# Patient Record
Sex: MALE | Race: WHITE | NOT HISPANIC OR LATINO | Employment: FULL TIME | URBAN - METROPOLITAN AREA
[De-identification: names, ages, dates, MRNs, and addresses within clinical notes are randomized per-mention and may not be internally consistent; named-entity substitution may affect disease eponyms.]

---

## 2018-07-15 ENCOUNTER — HOSPITAL ENCOUNTER (EMERGENCY)
Facility: HOSPITAL | Age: 53
Discharge: HOME | End: 2018-07-15
Attending: EMERGENCY MEDICINE
Payer: COMMERCIAL

## 2018-07-15 VITALS
OXYGEN SATURATION: 98 % | WEIGHT: 165 LBS | HEIGHT: 66 IN | SYSTOLIC BLOOD PRESSURE: 118 MMHG | HEART RATE: 84 BPM | RESPIRATION RATE: 16 BRPM | BODY MASS INDEX: 26.52 KG/M2 | DIASTOLIC BLOOD PRESSURE: 75 MMHG | TEMPERATURE: 98 F

## 2018-07-15 DIAGNOSIS — M71.50 TRAUMATIC BURSITIS: Primary | ICD-10-CM

## 2018-07-15 DIAGNOSIS — S59.901A ELBOW INJURY, RIGHT, INITIAL ENCOUNTER: ICD-10-CM

## 2018-07-15 PROCEDURE — 99283 EMERGENCY DEPT VISIT LOW MDM: CPT

## 2018-07-15 RX ORDER — LISINOPRIL 5 MG/1
5 TABLET ORAL DAILY
COMMUNITY

## 2018-07-16 NOTE — ED PROVIDER NOTES
Encounter Date: 7/15/2018    SCRIBE #1 NOTE: ICristina, am scribing for, and in the presence of, Rachael Meier PA-C.       History     Chief Complaint   Patient presents with    Joint Swelling     Rt Elbow pain x 1 month after striking elbow on wall.  Swelling and redness noted to site only        07/15/2018  8:03 PM    Chief Complaint: Elbow Pain      The patient is a 53 y.o. male with Hx of HTN who presents with waxing and waning right elbow pain for 1 month. Pt states he was trying to repair an air hose. When he yanked back, he hit his elbow against the wall. The elbow began swelling a few days later. 4-5 days after the swelling occurred, it resolved. He states he is unable to move his elbow occasionally due to pain. The pain worsens if he is more active. Pt has not taken any OTC medications. Denies numbness. No pertinent PSHx.       The history is provided by the patient.     Review of patient's allergies indicates:  No Known Allergies  Past Medical History:   Diagnosis Date    Hypertension      Past Surgical History:   Procedure Laterality Date    COLON SURGERY  06/15/2016    colo-vesicle fistula    COLONOSCOPY N/A 6/14/2016    Procedure: COLONOSCOPY;  Surgeon: Dmitri Thao MD;  Location: [facility];  Service: Endoscopy;  Laterality: N/A;     Family History   Problem Relation Age of Onset    Arthritis Mother     Diabetes Mother     Hyperlipidemia Mother     No Known Problems Father     No Known Problems Sister     Heart disease Brother     Hyperlipidemia Brother     Hypertension Brother     No Known Problems Daughter     No Known Problems Son      Social History   Substance Use Topics    Smoking status: Former Smoker     Packs/day: 1.50     Years: 25.00     Quit date: 12/9/2011    Smokeless tobacco: Former User     Quit date: 12/9/2011    Alcohol use 1.8 oz/week     3 Cans of beer per week      Comment: 3 cans a day     Review of Systems   Constitutional: Negative for chills and  fever.   HENT: Negative for facial swelling and trouble swallowing.    Eyes: Negative for discharge.   Respiratory: Negative for cough, chest tightness, shortness of breath and wheezing.    Cardiovascular: Negative for chest pain and palpitations.   Gastrointestinal: Negative for abdominal pain, diarrhea, nausea and vomiting.   Genitourinary: Negative for dysuria and hematuria.   Musculoskeletal: Positive for arthralgias (right elbow) and joint swelling (right elbow; resolved). Negative for back pain, myalgias, neck pain and neck stiffness.        +decreased ROM due to pain   Skin: Negative for color change, pallor, rash and wound.   Neurological: Negative for dizziness, syncope, weakness, light-headedness, numbness and headaches.   Hematological: Does not bruise/bleed easily.   Psychiatric/Behavioral: The patient is not nervous/anxious.        Physical Exam     Initial Vitals [07/15/18 1936]   BP Pulse Resp Temp SpO2   118/75 84 16 98.1 °F (36.7 °C) 98 %      MAP       --         Physical Exam    Nursing note and vitals reviewed.  Constitutional: He appears well-developed and well-nourished. He is not diaphoretic. No distress.   Cardiovascular: Intact distal pulses.   Musculoskeletal: Normal range of motion. He exhibits edema and tenderness.   Mild TTP and swelling noted to right olecranon with associated erythema, but no decreased ROM with flexion or extension of right elbow. Palpable 2+ radial pulse.    Neurological: He is alert and oriented to person, place, and time. He has normal strength. No sensory deficit.   Skin: Skin is warm and dry. No rash and no abscess noted. No erythema.   Psychiatric: He has a normal mood and affect.         ED Course   Procedures  Labs Reviewed - No data to display       Imaging Results          X-Ray Elbow Complete Right (In process)                  Medical Decision Making:   History:   Old Medical Records: I decided to obtain old medical records.  Differential Diagnosis:    Fracture  Dislocation  Sprain  Contusion  Strain  Spasm  Bursitis    Clinical Tests:   Radiological Study: Reviewed and Ordered       APC / Resident Notes:   X-rays show no acute abnormalities, fractures or dislocations.  Symptoms are likely traumatic bursitis.  Low suspicion for infectious bursitis.  Pt wants no medications.  He is discharged home to follow-up with orthopedics for re-evaluation and further treatment options.  He voices understanding and is agreeable to the plan.  He is given specific return precautions.          Scribe Attestation:   Scribe #1: I performed the above scribed service and the documentation accurately describes the services I performed. I attest to the accuracy of the note.    I, Rachael Meier PA-C, personally performed the services described in this documentation. All medical record entries made by the scribe were at my direction and in my presence.  I have reviewed the chart and agree that the record reflects my personal performance and is accurate and complete. Rachael Meier PA-C.  9:51 PM 07/15/2018          ED Course as of Jul 15 2112   Sun Jul 15, 2018   2044 XR R elbow:  Possible small effusion, no fx or dislocation. (my read)  [MR]      ED Course User Index  [MR] Eric Islas MD     Clinical Impression:   The primary encounter diagnosis was Traumatic bursitis. A diagnosis of Elbow injury, right, initial encounter was also pertinent to this visit.    1. Traumatic bursitis    2. Elbow injury, right, initial encounter        Disposition:   Disposition: Discharged  Condition: Stable                        Rachael Meier PA-C  07/15/18 6891

## 2018-08-06 ENCOUNTER — OFFICE VISIT (OUTPATIENT)
Dept: ORTHOPEDICS | Facility: CLINIC | Age: 53
End: 2018-08-06
Payer: COMMERCIAL

## 2018-08-06 VITALS
BODY MASS INDEX: 26.52 KG/M2 | DIASTOLIC BLOOD PRESSURE: 108 MMHG | HEART RATE: 99 BPM | WEIGHT: 165 LBS | HEIGHT: 66 IN | SYSTOLIC BLOOD PRESSURE: 189 MMHG

## 2018-08-06 DIAGNOSIS — M71.121 SEPTIC OLECRANON BURSITIS OF RIGHT ELBOW: Primary | ICD-10-CM

## 2018-08-06 PROCEDURE — 99999 PR PBB SHADOW E&M-EST. PATIENT-LVL III: CPT | Mod: PBBFAC,,, | Performed by: ORTHOPAEDIC SURGERY

## 2018-08-06 PROCEDURE — 3008F BODY MASS INDEX DOCD: CPT | Mod: CPTII,S$GLB,, | Performed by: ORTHOPAEDIC SURGERY

## 2018-08-06 PROCEDURE — 20605 DRAIN/INJ JOINT/BURSA W/O US: CPT | Mod: RT,S$GLB,, | Performed by: ORTHOPAEDIC SURGERY

## 2018-08-06 PROCEDURE — 3077F SYST BP >= 140 MM HG: CPT | Mod: CPTII,S$GLB,, | Performed by: ORTHOPAEDIC SURGERY

## 2018-08-06 PROCEDURE — 99203 OFFICE O/P NEW LOW 30 MIN: CPT | Mod: 25,S$GLB,, | Performed by: ORTHOPAEDIC SURGERY

## 2018-08-06 PROCEDURE — 3080F DIAST BP >= 90 MM HG: CPT | Mod: CPTII,S$GLB,, | Performed by: ORTHOPAEDIC SURGERY

## 2018-08-06 RX ORDER — SULFAMETHOXAZOLE AND TRIMETHOPRIM 800; 160 MG/1; MG/1
1 TABLET ORAL 2 TIMES DAILY
Qty: 20 TABLET | Refills: 0 | Status: SHIPPED | OUTPATIENT
Start: 2018-08-06 | End: 2018-08-13 | Stop reason: SDUPTHER

## 2018-08-06 NOTE — PROGRESS NOTES
Past Medical History:   Diagnosis Date    Hypertension        Past Surgical History:   Procedure Laterality Date    COLON SURGERY  06/15/2016    colo-vesicle fistula    COLONOSCOPY N/A 6/14/2016    Procedure: COLONOSCOPY;  Surgeon: Dmitri Thao MD;  Location: [facility];  Service: Endoscopy;  Laterality: N/A;       Current Outpatient Prescriptions   Medication Sig    lisinopril (PRINIVIL,ZESTRIL) 5 MG tablet Take 5 mg by mouth once daily.    multivitamin (THERAGRAN) tablet Take 1 tablet by mouth once daily.     No current facility-administered medications for this visit.        Review of patient's allergies indicates:  No Known Allergies    Family History   Problem Relation Age of Onset    Arthritis Mother     Diabetes Mother     Hyperlipidemia Mother     No Known Problems Father     No Known Problems Sister     Heart disease Brother     Hyperlipidemia Brother     Hypertension Brother     No Known Problems Daughter     No Known Problems Son        Social History     Social History    Marital status: Single     Spouse name: N/A    Number of children: N/A    Years of education: N/A     Occupational History    Not on file.     Social History Main Topics    Smoking status: Former Smoker     Packs/day: 1.50     Years: 25.00     Quit date: 12/9/2011    Smokeless tobacco: Former User     Quit date: 12/9/2011    Alcohol use 1.8 oz/week     3 Cans of beer per week      Comment: 3 cans a day    Drug use: No    Sexual activity: No     Other Topics Concern    Not on file     Social History Narrative    No narrative on file       Chief Complaint:   Chief Complaint   Patient presents with    Right Elbow - Pain       History of present illness:  This is a 53-year-old right-hand-dominant male seen for right elbow pain and swelling. This started about 2 months ago when he banged his elbow on a wall.  Started to get some swelling. Presented to the emergency room about 3 weeks ago.  X-rays were negative  for fracture but showed some posterior swelling and some degenerative change.  Symptoms are stable in moderate to severe.  Pain with trying to extend his elbow.      Review of Systems:    Constitution: Negative for chills, fever, and sweats.  Negative for unexplained weight loss.    HENT:  Negative for headaches and blurry vision.    Cardiovascular:Negative for chest pain or irregular heart beat. Negative for hypertension.    Respiratory:  Negative for cough and shortness of breath.    Gastrointestinal: Negative for abdominal pain, heartburn, melena, nausea, and vomitting.    Genitourinary:  Negative bladder incontinence and dysuria.    Musculoskeletal:  See HPI    Neurological: Negative for numbness.    Psychiatric/Behavioral: Negative for depression.  The patient is not nervous/anxious.      Endocrine: Negative for polyuria    Hematologic/Lymphatic: Negative for bleeding problem.  Does not bruise/bleed easily.    Skin: Negative for poor would healing and rash      Physical Examination:    Vital Signs:    Vitals:    08/06/18 1312   BP: (!) 189/108   Pulse: 99       Body mass index is 26.63 kg/m².    This a well-developed, well nourished patient in no acute distress.  They are alert and oriented and cooperative to examination.  Pt. walks without an antalgic gait.      Examination of the right elbow shows no signs of rashes. The patient has some posterior soft tissue swelling with foot was looks like resolving blister posteriorly.  There is also some erythema around olecranon bursa. The patient has decreased range of motion both in extension and flexion. Patient has full pronation and supination. Patient is nontender along the medial epicondyle and nontender over the lateral epicondyle. Nontender over the olecranon process.  Nontender along the course of the UCL. Patient has a negative valgus stress test and milking maneuver. Negative Tinel's sign over the cubital tunnel. 2+ radial pulse. Intact light touch  sensation.     Examination of the left elbow shows no signs of rashes or erythema. The patient has no masses, ecchymosis, or effusion. The patient has full range of motion from 0-160°. Patient has full pronation and supination. Patient is nontender along the medial epicondyle and nontender over the lateral epicondyle. Nontender over the olecranon process.  Nontender along the course of the UCL. Patient has a negative valgus stress test and milking maneuver. Negative Tinel's sign over the cubital tunnel. 2+ radial pulse. Intact light touch sensation.     X-rays:  Three views of the right elbow are available for review which show some posterior olecranon swelling.  Also shows some spurring over the lateral epicondyle that is chronic in nature.     Assessment::  Right olecranon bursitis    Plan:  I reviewed the findings with him today. I recommended aspiration to try and get the swelling down and help restore range of motion. Was unable to aspirate anything.  All trial month some Bactrim and see him back next week to see if it is going away.    This note was created using [employer] voice recognition software that occasionally misinterpreted phrases or words.    Consult note is delivered via Epic messaging service.

## 2018-08-06 NOTE — PROCEDURES
Intermediate Joint Aspiration/Injection  Date/Time: 8/6/2018 1:33 PM  Performed by: KERI RESTREPO  Authorized by: KERI RESTREPO     Consent Done?:  Yes (Written)  Indications:  Pain and joint swelling  Site marked: The procedure site was marked    Timeout: Prior to procedure the correct patient, procedure, and site was verified      Location:  Elbow  Site:  R olecranon bursa  Prep: Patient was prepped and draped in usual sterile fashion    Needle size:  18 G  Approach:  Posterior  Aspirate amount (ml):  0  Patient tolerance:  Patient tolerated the procedure well with no immediate complications

## 2018-08-13 ENCOUNTER — OFFICE VISIT (OUTPATIENT)
Dept: ORTHOPEDICS | Facility: CLINIC | Age: 53
End: 2018-08-13
Payer: COMMERCIAL

## 2018-08-13 VITALS
HEART RATE: 92 BPM | SYSTOLIC BLOOD PRESSURE: 142 MMHG | HEIGHT: 66 IN | BODY MASS INDEX: 26.52 KG/M2 | WEIGHT: 165 LBS | DIASTOLIC BLOOD PRESSURE: 86 MMHG

## 2018-08-13 DIAGNOSIS — M70.21 OLECRANON BURSITIS, RIGHT ELBOW: Primary | ICD-10-CM

## 2018-08-13 PROCEDURE — 3079F DIAST BP 80-89 MM HG: CPT | Mod: CPTII,S$GLB,, | Performed by: ORTHOPAEDIC SURGERY

## 2018-08-13 PROCEDURE — 99999 PR PBB SHADOW E&M-EST. PATIENT-LVL III: CPT | Mod: PBBFAC,,, | Performed by: ORTHOPAEDIC SURGERY

## 2018-08-13 PROCEDURE — 3077F SYST BP >= 140 MM HG: CPT | Mod: CPTII,S$GLB,, | Performed by: ORTHOPAEDIC SURGERY

## 2018-08-13 PROCEDURE — 99213 OFFICE O/P EST LOW 20 MIN: CPT | Mod: S$GLB,,, | Performed by: ORTHOPAEDIC SURGERY

## 2018-08-13 PROCEDURE — 3008F BODY MASS INDEX DOCD: CPT | Mod: CPTII,S$GLB,, | Performed by: ORTHOPAEDIC SURGERY

## 2018-08-13 RX ORDER — SULFAMETHOXAZOLE AND TRIMETHOPRIM 800; 160 MG/1; MG/1
1 TABLET ORAL 2 TIMES DAILY
Qty: 20 TABLET | Refills: 0 | Status: SHIPPED | OUTPATIENT
Start: 2018-08-13 | End: 2024-02-08 | Stop reason: ALTCHOICE

## 2018-08-13 NOTE — PROGRESS NOTES
Past Medical History:   Diagnosis Date    Hypertension        Past Surgical History:   Procedure Laterality Date    COLON SURGERY  06/15/2016    colo-vesicle fistula       Current Outpatient Medications   Medication Sig    lisinopril (PRINIVIL,ZESTRIL) 5 MG tablet Take 5 mg by mouth once daily.    multivitamin (THERAGRAN) tablet Take 1 tablet by mouth once daily.    sulfamethoxazole-trimethoprim 800-160mg (BACTRIM DS) 800-160 mg Tab Take 1 tablet by mouth 2 (two) times daily.     No current facility-administered medications for this visit.        Review of patient's allergies indicates:  No Known Allergies    Family History   Problem Relation Age of Onset    Arthritis Mother     Diabetes Mother     Hyperlipidemia Mother     No Known Problems Father     No Known Problems Sister     Heart disease Brother     Hyperlipidemia Brother     Hypertension Brother     No Known Problems Daughter     No Known Problems Son        Social History     Socioeconomic History    Marital status: Single     Spouse name: Not on file    Number of children: Not on file    Years of education: Not on file    Highest education level: Not on file   Social Needs    Financial resource strain: Not on file    Food insecurity - worry: Not on file    Food insecurity - inability: Not on file    Transportation needs - medical: Not on file    Transportation needs - non-medical: Not on file   Occupational History    Not on file   Tobacco Use    Smoking status: Former Smoker     Packs/day: 1.50     Years: 25.00     Pack years: 37.50     Last attempt to quit: 2011     Years since quittin.6    Smokeless tobacco: Former User     Quit date: 2011   Substance and Sexual Activity    Alcohol use: Yes     Alcohol/week: 1.8 oz     Types: 3 Cans of beer per week     Comment: 3 cans a day    Drug use: No    Sexual activity: No   Other Topics Concern    Not on file   Social History Narrative    Not on file       Chief  Complaint:   Chief Complaint   Patient presents with    Right Elbow - Pain       Interval history This is a 53-year-old right-hand-dominant male seen for right elbow pain and swelling. This started about 2 months ago when he banged his elbow on a wall.  Started to get some swelling. Presented to the emergency room about 3 weeks ago.  X-rays were negative for fracture but showed some posterior swelling and some degenerative change.  Symptoms are stable in moderate to severe.  Pain with trying to extend his elbow.    History of present illness:  The scab fell off his elbow yesterday and it started to drain a little.  He has been taking the antibiotics.  Pain is actually started to improve now.  Pain is down to a 2/10.      Review of Systems:    Constitution: Negative for chills, fever, and sweats.  Negative for unexplained weight loss.    HENT:  Negative for headaches and blurry vision.    Cardiovascular:Negative for chest pain or irregular heart beat. Negative for hypertension.    Respiratory:  Negative for cough and shortness of breath.    Gastrointestinal: Negative for abdominal pain, heartburn, melena, nausea, and vomitting.    Genitourinary:  Negative bladder incontinence and dysuria.    Musculoskeletal:  See HPI    Neurological: Negative for numbness.    Psychiatric/Behavioral: Negative for depression.  The patient is not nervous/anxious.      Endocrine: Negative for polyuria    Hematologic/Lymphatic: Negative for bleeding problem.  Does not bruise/bleed easily.    Skin: Negative for poor would healing and rash      Physical Examination:    Vital Signs:    Vitals:    08/13/18 1007   BP: (!) 142/86   Pulse: 92       Body mass index is 26.63 kg/m².    This a well-developed, well nourished patient in no acute distress.  They are alert and oriented and cooperative to examination.  Pt. walks without an antalgic gait.      Examination of the right elbow shows no signs of rashes. The patient has some posterior soft  tissue swelling and a small hole with some scant drainage.  Mostly bloody..  There is also some erythema around olecranon bursa. The patient has decreased range of motion both in extension and flexion. Patient has full pronation and supination.   2+ radial pulse. Intact light touch sensation.       X-rays:  Three views of the right elbow are available for review which show some posterior olecranon swelling.  Also shows some spurring over the lateral epicondyle that is chronic in nature.     Assessment::  Right olecranon bursitis    Plan:  Continue the Bactrim.  Start local wound care with daily dressing changes.  Follow up in 1 week for wound check.    This note was created using [employer] voice recognition software that occasionally misinterpreted phrases or words.    Consult note is delivered via Epic messaging service.

## 2018-08-20 ENCOUNTER — OFFICE VISIT (OUTPATIENT)
Dept: ORTHOPEDICS | Facility: CLINIC | Age: 53
End: 2018-08-20
Payer: COMMERCIAL

## 2018-08-20 VITALS
WEIGHT: 165 LBS | BODY MASS INDEX: 26.52 KG/M2 | SYSTOLIC BLOOD PRESSURE: 167 MMHG | DIASTOLIC BLOOD PRESSURE: 101 MMHG | HEIGHT: 66 IN | HEART RATE: 99 BPM

## 2018-08-20 DIAGNOSIS — M70.21 OLECRANON BURSITIS, RIGHT ELBOW: Primary | ICD-10-CM

## 2018-08-20 PROCEDURE — 3077F SYST BP >= 140 MM HG: CPT | Mod: CPTII,S$GLB,, | Performed by: ORTHOPAEDIC SURGERY

## 2018-08-20 PROCEDURE — 99999 PR PBB SHADOW E&M-EST. PATIENT-LVL III: CPT | Mod: PBBFAC,,, | Performed by: ORTHOPAEDIC SURGERY

## 2018-08-20 PROCEDURE — 3080F DIAST BP >= 90 MM HG: CPT | Mod: CPTII,S$GLB,, | Performed by: ORTHOPAEDIC SURGERY

## 2018-08-20 PROCEDURE — 3008F BODY MASS INDEX DOCD: CPT | Mod: CPTII,S$GLB,, | Performed by: ORTHOPAEDIC SURGERY

## 2018-08-20 PROCEDURE — 99212 OFFICE O/P EST SF 10 MIN: CPT | Mod: S$GLB,,, | Performed by: ORTHOPAEDIC SURGERY

## 2018-08-20 NOTE — PROGRESS NOTES
Past Medical History:   Diagnosis Date    Hypertension        Past Surgical History:   Procedure Laterality Date    COLON SURGERY  06/15/2016    colo-vesicle fistula       Current Outpatient Medications   Medication Sig    lisinopril (PRINIVIL,ZESTRIL) 5 MG tablet Take 5 mg by mouth once daily.    multivitamin (THERAGRAN) tablet Take 1 tablet by mouth once daily.    sulfamethoxazole-trimethoprim 800-160mg (BACTRIM DS) 800-160 mg Tab Take 1 tablet by mouth 2 (two) times daily.     No current facility-administered medications for this visit.        Review of patient's allergies indicates:  No Known Allergies    Family History   Problem Relation Age of Onset    Arthritis Mother     Diabetes Mother     Hyperlipidemia Mother     No Known Problems Father     No Known Problems Sister     Heart disease Brother     Hyperlipidemia Brother     Hypertension Brother     No Known Problems Daughter     No Known Problems Son        Social History     Socioeconomic History    Marital status: Single     Spouse name: Not on file    Number of children: Not on file    Years of education: Not on file    Highest education level: Not on file   Social Needs    Financial resource strain: Not on file    Food insecurity - worry: Not on file    Food insecurity - inability: Not on file    Transportation needs - medical: Not on file    Transportation needs - non-medical: Not on file   Occupational History    Not on file   Tobacco Use    Smoking status: Former Smoker     Packs/day: 1.50     Years: 25.00     Pack years: 37.50     Last attempt to quit: 2011     Years since quittin.7    Smokeless tobacco: Former User     Quit date: 2011   Substance and Sexual Activity    Alcohol use: Yes     Alcohol/week: 1.8 oz     Types: 3 Cans of beer per week     Comment: 3 cans a day    Drug use: No    Sexual activity: No   Other Topics Concern    Not on file   Social History Narrative    Not on file       Chief  Complaint:   Chief Complaint   Patient presents with    Elbow Pain     R elbow 1 wk f/u       Interval history This is a 53-year-old right-hand-dominant male seen for right elbow pain and swelling. This started about 2 months ago when he banged his elbow on a wall.  Started to get some swelling. Presented to the emergency room about 3 weeks ago.  X-rays were negative for fracture but showed some posterior swelling and some degenerative change.  Symptoms are stable in moderate to severe.  Pain with trying to extend his elbow.    History of present illness:  Elbows feeling better.  He is able to flex it all the way.  Does not have the tenderness that he had before.  Still with some scant drainage.  Pain of 2/10.      Review of Systems:    Constitution: Negative for chills, fever, and sweats.  Negative for unexplained weight loss.    HENT:  Negative for headaches and blurry vision.    Cardiovascular:Negative for chest pain or irregular heart beat. Negative for hypertension.    Respiratory:  Negative for cough and shortness of breath.    Gastrointestinal: Negative for abdominal pain, heartburn, melena, nausea, and vomitting.    Genitourinary:  Negative bladder incontinence and dysuria.    Musculoskeletal:  See HPI    Neurological: Negative for numbness.    Psychiatric/Behavioral: Negative for depression.  The patient is not nervous/anxious.      Endocrine: Negative for polyuria    Hematologic/Lymphatic: Negative for bleeding problem.  Does not bruise/bleed easily.    Skin: Negative for poor would healing and rash      Physical Examination:    Vital Signs:    Vitals:    08/20/18 0921   BP: (!) 167/101   Pulse: 99       Body mass index is 26.63 kg/m².    This a well-developed, well nourished patient in no acute distress.  They are alert and oriented and cooperative to examination.  Pt. walks without an antalgic gait.      Examination of the right elbow shows no signs of rashes. The patient has some posterior soft tissue  swelling and a small hole with some scant drainage.  There is also some erythema around olecranon bursa. The patient has improved range of motion compared to last week. Patient has full pronation and supination.   2+ radial pulse. Intact light touch sensation.       X-rays:  Three views of the right elbow are available for review which show some posterior olecranon swelling.  Also shows some spurring over the lateral epicondyle that is chronic in nature.     Assessment::  Right olecranon bursitis    Plan:  Continue the Bactrim.  Has more motion and less pain.  Follow up in 2 weeks.    This note was created using [employer] voice recognition software that occasionally misinterpreted phrases or words.    Consult note is delivered via Epic messaging service.

## 2018-09-06 ENCOUNTER — OFFICE VISIT (OUTPATIENT)
Dept: ORTHOPEDICS | Facility: CLINIC | Age: 53
End: 2018-09-06
Payer: COMMERCIAL

## 2018-09-06 VITALS
HEIGHT: 66 IN | WEIGHT: 165 LBS | DIASTOLIC BLOOD PRESSURE: 93 MMHG | HEART RATE: 81 BPM | BODY MASS INDEX: 26.52 KG/M2 | SYSTOLIC BLOOD PRESSURE: 144 MMHG

## 2018-09-06 DIAGNOSIS — M70.21 OLECRANON BURSITIS, RIGHT ELBOW: Primary | ICD-10-CM

## 2018-09-06 PROCEDURE — 3008F BODY MASS INDEX DOCD: CPT | Mod: CPTII,S$GLB,, | Performed by: ORTHOPAEDIC SURGERY

## 2018-09-06 PROCEDURE — 99213 OFFICE O/P EST LOW 20 MIN: CPT | Mod: S$GLB,,, | Performed by: ORTHOPAEDIC SURGERY

## 2018-09-06 PROCEDURE — 3080F DIAST BP >= 90 MM HG: CPT | Mod: CPTII,S$GLB,, | Performed by: ORTHOPAEDIC SURGERY

## 2018-09-06 PROCEDURE — 99999 PR PBB SHADOW E&M-EST. PATIENT-LVL III: CPT | Mod: PBBFAC,,, | Performed by: ORTHOPAEDIC SURGERY

## 2018-09-06 PROCEDURE — 3077F SYST BP >= 140 MM HG: CPT | Mod: CPTII,S$GLB,, | Performed by: ORTHOPAEDIC SURGERY

## 2018-09-06 NOTE — PROGRESS NOTES
Past Medical History:   Diagnosis Date    Hypertension        Past Surgical History:   Procedure Laterality Date    COLON SURGERY  06/15/2016    colo-vesicle fistula       Current Outpatient Medications   Medication Sig    lisinopril (PRINIVIL,ZESTRIL) 5 MG tablet Take 5 mg by mouth once daily.    multivitamin (THERAGRAN) tablet Take 1 tablet by mouth once daily.    sulfamethoxazole-trimethoprim 800-160mg (BACTRIM DS) 800-160 mg Tab Take 1 tablet by mouth 2 (two) times daily.     No current facility-administered medications for this visit.        Review of patient's allergies indicates:  No Known Allergies    Family History   Problem Relation Age of Onset    Arthritis Mother     Diabetes Mother     Hyperlipidemia Mother     No Known Problems Father     No Known Problems Sister     Heart disease Brother     Hyperlipidemia Brother     Hypertension Brother     No Known Problems Daughter     No Known Problems Son        Social History     Socioeconomic History    Marital status: Single     Spouse name: Not on file    Number of children: Not on file    Years of education: Not on file    Highest education level: Not on file   Social Needs    Financial resource strain: Not on file    Food insecurity - worry: Not on file    Food insecurity - inability: Not on file    Transportation needs - medical: Not on file    Transportation needs - non-medical: Not on file   Occupational History    Not on file   Tobacco Use    Smoking status: Former Smoker     Packs/day: 1.50     Years: 25.00     Pack years: 37.50     Last attempt to quit: 2011     Years since quittin.7    Smokeless tobacco: Former User     Quit date: 2011   Substance and Sexual Activity    Alcohol use: Yes     Alcohol/week: 1.8 oz     Types: 3 Cans of beer per week     Comment: 3 cans a day    Drug use: No    Sexual activity: No   Other Topics Concern    Not on file   Social History Narrative    Not on file       Chief  Complaint:   Chief Complaint   Patient presents with    Elbow Pain     s/p olecranon bursitis        Interval history This is a 53-year-old right-hand-dominant male seen for right elbow pain and swelling. This started about 2 months ago when he banged his elbow on a wall.  Started to get some swelling. Presented to the emergency room about 3 weeks ago.  X-rays were negative for fracture but showed some posterior swelling and some degenerative change.      History of present illness:  Elbows feeling better.  He is able to flex it all the way.   No more drainage.  Pain is a 3/10 still when he rested on the table.  Still has some redness but no swelling.      Review of Systems:    Constitution: Negative for chills, fever, and sweats.  Negative for unexplained weight loss.    HENT:  Negative for headaches and blurry vision.    Cardiovascular:Negative for chest pain or irregular heart beat. Negative for hypertension.    Respiratory:  Negative for cough and shortness of breath.    Gastrointestinal: Negative for abdominal pain, heartburn, melena, nausea, and vomitting.    Genitourinary:  Negative bladder incontinence and dysuria.    Musculoskeletal:  See HPI    Neurological: Negative for numbness.    Psychiatric/Behavioral: Negative for depression.  The patient is not nervous/anxious.      Endocrine: Negative for polyuria    Hematologic/Lymphatic: Negative for bleeding problem.  Does not bruise/bleed easily.    Skin: Negative for poor would healing and rash      Physical Examination:    Vital Signs:    Vitals:    09/06/18 0821   BP: (!) 144/93   Pulse: 81       Body mass index is 26.63 kg/m².    This a well-developed, well nourished patient in no acute distress.  They are alert and oriented and cooperative to examination.  Pt. walks without an antalgic gait.      Examination of the right elbow shows no signs of rashes. The patient has   Very little swelling and the whole has closed.  No drainage.  Mild redness of the  posterior elbow in general.  No fluctuance.  No bursal bogginess..The patient has improved range of motion compared to last week. Patient has full pronation and supination.   2+ radial pulse. Intact light touch sensation.       X-rays:  Three views of the right elbow are available for review which show some posterior olecranon swelling.  Also shows some spurring over the lateral epicondyle that is chronic in nature.     Assessment::  Right olecranon bursitis    Plan:   The wound is fully closed with still giving him trouble.  We discussed olecranon bursectomy.  He would like to watch it for another 3 weeks.    This note was created using [employer] voice recognition software that occasionally misinterpreted phrases or words.    Consult note is delivered via Epic messaging service.

## 2018-09-27 ENCOUNTER — OFFICE VISIT (OUTPATIENT)
Dept: ORTHOPEDICS | Facility: CLINIC | Age: 53
End: 2018-09-27
Payer: COMMERCIAL

## 2018-09-27 VITALS
HEIGHT: 66 IN | HEART RATE: 74 BPM | WEIGHT: 165 LBS | BODY MASS INDEX: 26.52 KG/M2 | DIASTOLIC BLOOD PRESSURE: 84 MMHG | SYSTOLIC BLOOD PRESSURE: 140 MMHG

## 2018-09-27 DIAGNOSIS — M70.21 OLECRANON BURSITIS, RIGHT ELBOW: Primary | ICD-10-CM

## 2018-09-27 PROCEDURE — 99212 OFFICE O/P EST SF 10 MIN: CPT | Mod: S$GLB,,, | Performed by: ORTHOPAEDIC SURGERY

## 2018-09-27 PROCEDURE — 3079F DIAST BP 80-89 MM HG: CPT | Mod: CPTII,S$GLB,, | Performed by: ORTHOPAEDIC SURGERY

## 2018-09-27 PROCEDURE — 99999 PR PBB SHADOW E&M-EST. PATIENT-LVL III: CPT | Mod: PBBFAC,,, | Performed by: ORTHOPAEDIC SURGERY

## 2018-09-27 PROCEDURE — 3008F BODY MASS INDEX DOCD: CPT | Mod: CPTII,S$GLB,, | Performed by: ORTHOPAEDIC SURGERY

## 2018-09-27 PROCEDURE — 3077F SYST BP >= 140 MM HG: CPT | Mod: CPTII,S$GLB,, | Performed by: ORTHOPAEDIC SURGERY

## 2018-09-27 NOTE — PROGRESS NOTES
Past Medical History:   Diagnosis Date    Hypertension        Past Surgical History:   Procedure Laterality Date    COLON SURGERY  06/15/2016    colo-vesicle fistula    COLONOSCOPY N/A 6/14/2016    Procedure: COLONOSCOPY;  Surgeon: Dmitri Thao MD;  Location: [facility];  Service: Endoscopy;  Laterality: N/A;    COLONOSCOPY N/A 6/14/2016    Performed by D[facility]ri Thao [facility] at [facility] ENDO    CYSTOSCOPY WITH STENT PLACEMENT Bilateral 6/15/2016    Performed by Agueda Blanco MD at [facility] OR    RESECTION-COLON AND RECTAL-LOW ANTERIOR N/A 6/15/2016    Performed by Maldonado Luo MD at [facility] OR       Current Outpatient Medications   Medication Sig    lisinopril (PRINIVIL,ZESTRIL) 5 MG tablet Take 5 mg by mouth once daily.    multivitamin (THERAGRAN) tablet Take 1 tablet by mouth once daily.    sulfamethoxazole-trimethoprim 800-160mg (BACTRIM DS) 800-160 mg Tab Take 1 tablet by mouth 2 (two) times daily.     No current facility-administered medications for this visit.        Review of patient's allergies indicates:  No Known Allergies    Family History   Problem Relation Age of Onset    Arthritis Mother     Diabetes Mother     Hyperlipidemia Mother     No Known Problems Father     No Known Problems Sister     Heart disease Brother     Hyperlipidemia Brother     Hypertension Brother     No Known Problems Daughter     No Known Problems Son        Social History     Socioeconomic History    Marital status: Single     Spouse name: Not on file    Number of children: Not on file    Years of education: Not on file    Highest education level: Not on file   Social Needs    Financial resource strain: Not on file    Food insecurity - worry: Not on file    Food insecurity - inability: Not on file    Transportation needs - medical: Not on file    Transportation needs - non-medical: Not on file   Occupational History    Not on file   Tobacco Use    Smoking status: Former Smoker     Packs/day: 1.50      Years: 25.00     Pack years: 37.50     Last attempt to quit: 2011     Years since quittin.8    Smokeless tobacco: Former User     Quit date: 2011   Substance and Sexual Activity    Alcohol use: Yes     Alcohol/week: 1.8 oz     Types: 3 Cans of beer per week     Comment: 3 cans a day    Drug use: No    Sexual activity: No   Other Topics Concern    Not on file   Social History Narrative    Not on file       Chief Complaint:   Chief Complaint   Patient presents with    Elbow Pain     right elbow-3 week follow up       Interval history This is a 53-year-old right-hand-dominant male seen for right elbow pain and swelling. This started about 2 months ago when he banged his elbow on a wall.  Started to get some swelling. Presented to the emergency room about 3 weeks ago.  X-rays were negative for fracture but showed some posterior swelling and some degenerative change.      History of present illness:  He is doing well.  No pain.  No drainage.  No swelling.      Review of Systems:    Constitution: Negative for chills, fever, and sweats.  Negative for unexplained weight loss.    HENT:  Negative for headaches and blurry vision.    Cardiovascular:Negative for chest pain or irregular heart beat. Negative for hypertension.    Respiratory:  Negative for cough and shortness of breath.    Gastrointestinal: Negative for abdominal pain, heartburn, melena, nausea, and vomitting.    Genitourinary:  Negative bladder incontinence and dysuria.    Musculoskeletal:  See HPI    Neurological: Negative for numbness.    Psychiatric/Behavioral: Negative for depression.  The patient is not nervous/anxious.      Endocrine: Negative for polyuria    Hematologic/Lymphatic: Negative for bleeding problem.  Does not bruise/bleed easily.    Skin: Negative for poor would healing and rash      Physical Examination:    Vital Signs:    Vitals:    18 1000   BP: (!) 140/84   Pulse: 74       Body mass index is 26.63  kg/m².    This a well-developed, well nourished patient in no acute distress.  They are alert and oriented and cooperative to examination.  Pt. walks without an antalgic gait.      Examination of the right elbow shows no signs of rashes. The patient has   no swelling and the hole has closed.  No drainage.  Mild redness of the posterior elbow in general.  No fluctuance.  No bursal bogginess..The patient has  Full range of motion Patient has full pronation and supination.   2+ radial pulse. Intact light touch sensation.       X-rays:  Three views of the right elbow are available for review which show some posterior olecranon swelling.  Also shows some spurring over the lateral epicondyle that is chronic in nature.     Assessment::  Right olecranon bursitis    Plan:    Patient notices no difference between the 2  Elbows.  He will follow up as needed.    This note was created using [employer] voice recognition software that occasionally misinterpreted phrases or words.    Consult note is delivered via Epic messaging service.

## 2024-02-05 ENCOUNTER — OFFICE VISIT (OUTPATIENT)
Dept: URGENT CARE | Facility: CLINIC | Age: 59
End: 2024-02-05
Payer: COMMERCIAL

## 2024-02-05 VITALS
HEART RATE: 109 BPM | TEMPERATURE: 99 F | BODY MASS INDEX: 27.77 KG/M2 | WEIGHT: 172.81 LBS | HEIGHT: 66 IN | SYSTOLIC BLOOD PRESSURE: 200 MMHG | RESPIRATION RATE: 20 BRPM | OXYGEN SATURATION: 98 % | DIASTOLIC BLOOD PRESSURE: 131 MMHG

## 2024-02-05 DIAGNOSIS — M25.512 ACUTE PAIN OF LEFT SHOULDER: Primary | ICD-10-CM

## 2024-02-05 PROCEDURE — 73030 X-RAY EXAM OF SHOULDER: CPT | Mod: LT,S$GLB,, | Performed by: RADIOLOGY

## 2024-02-05 PROCEDURE — 99204 OFFICE O/P NEW MOD 45 MIN: CPT | Mod: S$GLB,,,

## 2024-02-05 RX ORDER — CYCLOBENZAPRINE HCL 10 MG
10 TABLET ORAL 3 TIMES DAILY PRN
Qty: 30 TABLET | Refills: 0 | Status: SHIPPED | OUTPATIENT
Start: 2024-02-05 | End: 2024-02-15

## 2024-02-06 NOTE — PATIENT INSTRUCTIONS
Muscle relaxers may cause sedation.  Do not take and operate machinery.  Continue using NSAIDs.  Follow up with the orthopedic

## 2024-02-06 NOTE — PROGRESS NOTES
"Subjective:      Patient ID: Jarvis Ward is a 59 y.o. male.    Vitals:  height is 5' 5.5" (1.664 m) and weight is 78.4 kg (172 lb 12.8 oz). His temperature is 98.9 °F (37.2 °C). His blood pressure is 200/131 (abnormal) and his pulse is 109. His respiration is 20 and oxygen saturation is 98%.     Chief Complaint: Shoulder Pain    Patient in clinic with a chief complaint left trapezius pain radiating down behind left scapula, and also left deltoid pain with pain radiating down lateral aspect of humerus to the elbow.  He denies trauma.  Reports pain is a 6 out of 10.  Pain started approximately 5 days ago and has been constant.  No previous orthopedic injury.  Patient is right-hand dominant.    No obvious fractures or dislocation appreciated upon independent evaluation of plain image    Shoulder Pain   The pain is present in the left shoulder and left arm. This is a new problem. The current episode started in the past 7 days. There has been no history of extremity trauma. The problem occurs constantly. The problem has been unchanged. The quality of the pain is described as aching. The pain is at a severity of 6/10. The pain is moderate. Pertinent negatives include no headaches, joint locking, limited range of motion or stiffness. Exacerbated by: Hanging free. He has tried NSAIDS for the symptoms. The treatment provided no relief. Family history does not include arthritis. There is no history of Injuries to Extremity or migraines.       HENT: Negative.     Neck: Positive for degenerative disc disease.   Cardiovascular: Negative.    Respiratory: Negative.     Endocrine: negative.   Musculoskeletal:  Positive for joint pain and muscle ache. Negative for abnormal ROM of joint.   Skin:  Positive for erythema.   Neurological:  Negative for headaches.   Hematologic/Lymphatic: Negative.    Psychiatric/Behavioral: Negative.        Objective:     Physical Exam   Constitutional: He is oriented to person, place, and " time. He appears well-developed. He is cooperative.   HENT:   Head: Normocephalic and atraumatic.   Ears:   Right Ear: Hearing and external ear normal.   Left Ear: Hearing and external ear normal.   Nose: Nose normal. No mucosal edema or nasal deformity. No epistaxis. Right sinus exhibits no maxillary sinus tenderness and no frontal sinus tenderness. Left sinus exhibits no maxillary sinus tenderness and no frontal sinus tenderness.   Mouth/Throat: Uvula is midline, oropharynx is clear and moist and mucous membranes are normal. Mucous membranes are moist. No trismus in the jaw. Normal dentition. No uvula swelling. Oropharynx is clear.   Eyes: Conjunctivae and lids are normal. Pupils are equal, round, and reactive to light. Extraocular movement intact   Neck: Trachea normal and phonation normal. Neck supple.   Cardiovascular: Regular rhythm, normal heart sounds and normal pulses. Tachycardia present.   Pulmonary/Chest: Effort normal and breath sounds normal.   Abdominal: Normal appearance.   Musculoskeletal: Normal range of motion.         General: Normal range of motion.      Left shoulder: He exhibits tenderness and crepitus. He exhibits no bony tenderness, no deformity, normal pulse and normal strength.      Left upper arm: He exhibits tenderness. He exhibits no bony tenderness, no swelling, no edema and no deformity.        Arms:       Comments: Skin mildly erythematous, and warm to touch.  No edema or fluctuance.   Neurological: no focal deficit. He is alert, oriented to person, place, and time and at baseline. He exhibits normal muscle tone.   Skin: Skin is warm, dry and intact. Capillary refill takes 2 to 3 seconds. erythema        Psychiatric: His speech is normal and behavior is normal. Mood, judgment and thought content normal.   Nursing note and vitals reviewed.      Assessment:     1. Acute pain of left shoulder        Plan:       Acute pain of left shoulder  -     X-Ray Shoulder 2 or More Views Left;  Future; Expected date: 02/05/2024  -     cyclobenzaprine (FLEXERIL) 10 MG tablet; Take 1 tablet (10 mg total) by mouth 3 (three) times daily as needed for Muscle spasms.  Dispense: 30 tablet; Refill: 0  -     Ambulatory referral/consult to Orthopedics

## 2024-02-07 ENCOUNTER — TELEPHONE (OUTPATIENT)
Dept: ORTHOPEDICS | Facility: CLINIC | Age: 59
End: 2024-02-07

## 2024-02-07 ENCOUNTER — OFFICE VISIT (OUTPATIENT)
Dept: ORTHOPEDICS | Facility: CLINIC | Age: 59
End: 2024-02-07
Payer: COMMERCIAL

## 2024-02-07 VITALS
WEIGHT: 172 LBS | OXYGEN SATURATION: 97 % | HEART RATE: 60 BPM | HEIGHT: 66 IN | BODY MASS INDEX: 27.64 KG/M2 | RESPIRATION RATE: 18 BRPM

## 2024-02-07 DIAGNOSIS — M75.42 IMPINGEMENT SYNDROME OF LEFT SHOULDER: Primary | ICD-10-CM

## 2024-02-07 PROCEDURE — 1159F MED LIST DOCD IN RCRD: CPT | Mod: CPTII,S$GLB,, | Performed by: ORTHOPAEDIC SURGERY

## 2024-02-07 PROCEDURE — 99999 PR PBB SHADOW E&M-EST. PATIENT-LVL III: CPT | Mod: PBBFAC,,, | Performed by: ORTHOPAEDIC SURGERY

## 2024-02-07 PROCEDURE — 99204 OFFICE O/P NEW MOD 45 MIN: CPT | Mod: S$GLB,,, | Performed by: ORTHOPAEDIC SURGERY

## 2024-02-07 PROCEDURE — 1160F RVW MEDS BY RX/DR IN RCRD: CPT | Mod: CPTII,S$GLB,, | Performed by: ORTHOPAEDIC SURGERY

## 2024-02-07 PROCEDURE — 3008F BODY MASS INDEX DOCD: CPT | Mod: CPTII,S$GLB,, | Performed by: ORTHOPAEDIC SURGERY

## 2024-02-07 RX ORDER — MELOXICAM 15 MG/1
15 TABLET ORAL DAILY
Qty: 30 TABLET | Refills: 1 | Status: SHIPPED | OUTPATIENT
Start: 2024-02-07

## 2024-02-07 NOTE — TELEPHONE ENCOUNTER
----- Message from Perlita Mendoza MA sent at 2/7/2024  9:55 AM CST -----  Contact: pt  States they are at pharmacy   Nothing called in   Call back

## 2024-02-07 NOTE — PROGRESS NOTES
Subjective:      Patient ID: Jarvis Ward is a 59 y.o. male.    Chief Complaint: Pain of the Left Shoulder    HPI  59-year-old male with a five-day history of left shoulder pain.  He states that this started fairly acutely.  He is right-hand dominant  but recalls no trauma.  Denies neck pain Denies any fevers or chills or radiating pain numbness or tingling.  Was seen by his urgent care center and referred for further evaluation.  Was given some Flexeril without any improvement.  ROS      Objective:    Ortho Exam     Constitutional:   Patient is alert  and oriented in no acute distress  HEENT:  normocephalic atraumatic; PERRL EOMI  Neck:  Supple without adenopathy  Cardiovascular:  Normal rate and rhythm  Pulmonary:  Normal respiratory effort normal chest wall expansion  Abdominal:  Nonprotuberant nondistended  Musculoskeletal:  Patient has a steady nonantalgic gait.  Neck is supple with adequate range of motion  No Lhermitte's or Spurling sign.  Adequate range of motion of the left shoulder with discomfort with full forward flexion and internal rotation.  There is a mildly positive impingement sign.  Negative Yergason's and Speed's testing.  No gross weakness however pain with strength testing against resistance there is no swelling mass or atrophy noted.  There is a normal distal neurologic and vascular examination.  Neurological:  No focal defect; cranial nerves 2-12 grossly intact  Psychiatric/behavioral:  Mood and behavior normal    X-Ray Shoulder 2 or More Views Left  Narrative: EXAMINATION:  XR SHOULDER COMPLETE 2 OR MORE VIEWS LEFT    CLINICAL HISTORY:  Pain in left shoulder    TECHNIQUE:  Two or three views of the left shoulder were performed.    COMPARISON:  None    FINDINGS:  There is no fracture or dislocation.  The soft tissues are unremarkable.  Impression: No acute osseous abnormality.    Electronically signed by: Jeffrey Costello MD  Date:    02/06/2024  Time:    08:32       My  Radiographs Findings:    I have personally reviewed radiographs and concur with above findings    Assessment:       Encounter Diagnosis   Name Primary?    Impingement syndrome of left shoulder Yes         Plan:       I have discussed medical condition treatment options him at length.  We have provided some rehab exercises for him.  I have discussed generalized activity restrictions avoiding heavy lifting with his left shoulder.  We will give him a trial of meloxicam GI cardiac and renal precautions were discussed.  If symptoms fail to improve I would like to see him back we could also alternatively provide him a prescription for physical therapy.  Otherwise follow up can be as needed.        Past Medical History:   Diagnosis Date    Hypertension      Past Surgical History:   Procedure Laterality Date    COLON SURGERY  06/15/2016    colo-vesicle fistula    COLONOSCOPY N/A 6/14/2016    Procedure: COLONOSCOPY;  Surgeon: Dmitri Thao MD;  Location: [facility];  Service: Endoscopy;  Laterality: N/A;         Current Outpatient Medications:     cyclobenzaprine (FLEXERIL) 10 MG tablet, Take 1 tablet (10 mg total) by mouth 3 (three) times daily as needed for Muscle spasms., Disp: 30 tablet, Rfl: 0    lisinopril (PRINIVIL,ZESTRIL) 5 MG tablet, Take 5 mg by mouth once daily., Disp: , Rfl:     multivitamin (THERAGRAN) tablet, Take 1 tablet by mouth once daily., Disp: , Rfl:     sulfamethoxazole-trimethoprim 800-160mg (BACTRIM DS) 800-160 mg Tab, Take 1 tablet by mouth 2 (two) times daily., Disp: 20 tablet, Rfl: 0    Review of patient's allergies indicates:  No Known Allergies    Family History   Problem Relation Age of Onset    Arthritis Mother     Diabetes Mother     Hyperlipidemia Mother     No Known Problems Father     No Known Problems Sister     Heart disease Brother     Hyperlipidemia Brother     Hypertension Brother     No Known Problems Daughter     No Known Problems Son      Social History     Occupational History     Not on file   Tobacco Use    Smoking status: Former     Current packs/day: 0.00     Average packs/day: 1.5 packs/day for 25.0 years (37.5 ttl pk-yrs)     Types: Cigarettes     Start date: 1986     Quit date: 2011     Years since quittin.1    Smokeless tobacco: Former     Quit date: 2011   Substance and Sexual Activity    Alcohol use: Yes     Alcohol/week: 3.0 standard drinks of alcohol     Types: 3 Cans of beer per week     Comment: 3 cans a day    Drug use: No    Sexual activity: Never

## 2024-02-07 NOTE — TELEPHONE ENCOUNTER
Returned call to pt. Informed him the medication was sent to the pharmacy on file. It may take a little bit for them to fill.

## 2024-02-23 ENCOUNTER — OFFICE VISIT (OUTPATIENT)
Dept: ORTHOPEDICS | Facility: CLINIC | Age: 59
End: 2024-02-23
Payer: COMMERCIAL

## 2024-02-23 ENCOUNTER — TELEPHONE (OUTPATIENT)
Dept: ORTHOPEDICS | Facility: CLINIC | Age: 59
End: 2024-02-23

## 2024-02-23 VITALS — HEART RATE: 140 BPM | OXYGEN SATURATION: 98 % | BODY MASS INDEX: 27.63 KG/M2 | HEIGHT: 66 IN | WEIGHT: 171.94 LBS

## 2024-02-23 DIAGNOSIS — M75.42 IMPINGEMENT SYNDROME OF LEFT SHOULDER: Primary | ICD-10-CM

## 2024-02-23 PROCEDURE — 99999 PR PBB SHADOW E&M-EST. PATIENT-LVL III: CPT | Mod: PBBFAC,,, | Performed by: ORTHOPAEDIC SURGERY

## 2024-02-23 PROCEDURE — 1159F MED LIST DOCD IN RCRD: CPT | Mod: CPTII,S$GLB,, | Performed by: ORTHOPAEDIC SURGERY

## 2024-02-23 PROCEDURE — 3008F BODY MASS INDEX DOCD: CPT | Mod: CPTII,S$GLB,, | Performed by: ORTHOPAEDIC SURGERY

## 2024-02-23 PROCEDURE — 1160F RVW MEDS BY RX/DR IN RCRD: CPT | Mod: CPTII,S$GLB,, | Performed by: ORTHOPAEDIC SURGERY

## 2024-02-23 PROCEDURE — 99214 OFFICE O/P EST MOD 30 MIN: CPT | Mod: 25,S$GLB,, | Performed by: ORTHOPAEDIC SURGERY

## 2024-02-23 PROCEDURE — 20610 DRAIN/INJ JOINT/BURSA W/O US: CPT | Mod: LT,S$GLB,, | Performed by: ORTHOPAEDIC SURGERY

## 2024-02-23 RX ORDER — TRIAMCINOLONE ACETONIDE 40 MG/ML
40 INJECTION, SUSPENSION INTRA-ARTICULAR; INTRAMUSCULAR
Status: DISCONTINUED | OUTPATIENT
Start: 2024-02-23 | End: 2024-02-23 | Stop reason: HOSPADM

## 2024-02-23 RX ADMIN — TRIAMCINOLONE ACETONIDE 40 MG: 40 INJECTION, SUSPENSION INTRA-ARTICULAR; INTRAMUSCULAR at 09:02

## 2024-02-23 NOTE — PROCEDURES
Large Joint Aspiration/Injection: L subacromial bursa    Date/Time: 2/23/2024 9:15 AM    Performed by: Loki Nguyen MD  Authorized by: Loki Nguyen MD    Consent Done?:  Yes (Verbal)  Indications:  Pain  Site marked: the procedure site was marked    Timeout: prior to procedure the correct patient, procedure, and site was verified      Local anesthesia used?: Yes    Local anesthetic: Ropivicaine.  Anesthetic total (ml):  3      Details:  Needle Size:  21 G  Ultrasonic Guidance for needle placement?: No    Approach:  Posterior  Location:  Shoulder  Site:  L subacromial bursa  Medications:  40 mg triamcinolone acetonide 40 mg/mL  Patient tolerance:  Patient tolerated the procedure well with no immediate complications

## 2024-02-23 NOTE — PROGRESS NOTES
Subjective:      Patient ID: Jarvis Ward is a 59 y.o. male.    Chief Complaint: Pain of the Left Shoulder    HPI  Patient follow up on his left shoulder.  Pain has slightly improved as well as his motion.  ROS      Objective:    Ortho Exam     Constitutional:   Patient is alert  and oriented in no acute distress  HEENT:  normocephalic atraumatic; PERRL EOMI  Neck:  Supple without adenopathy  Cardiovascular:  Normal rate and rhythm  Pulmonary:  Normal respiratory effort normal chest wall expansion  Abdominal:  Nonprotuberant nondistended  Musculoskeletal:  Patient has a steady nonantalgic gait.  Neck is supple with adequate range of motion  No Lhermitte's or Spurling sign.  Improved range of motion of the left shoulder with discomfort with full forward flexion and internal rotation.  There is a mildly positive impingement sign.  Negative Yergason's and Speed's testing.  No gross weakness however pain with strength testing against resistance there is no swelling mass or atrophy noted.  There is a normal distal neurologic and vascular examination.  Neurological:  No focal defect; cranial nerves 2-12 grossly intact  Psychiatric/behavioral:  Mood and behavior normal    X-Ray Shoulder 2 or More Views Left  Narrative: EXAMINATION:  XR SHOULDER COMPLETE 2 OR MORE VIEWS LEFT    CLINICAL HISTORY:  Pain in left shoulder    TECHNIQUE:  Two or three views of the left shoulder were performed.    COMPARISON:  None    FINDINGS:  There is no fracture or dislocation.  The soft tissues are unremarkable.  Impression: No acute osseous abnormality.    Electronically signed by: Jeffrey Costello MD  Date:    02/06/2024  Time:    08:32       My Radiographs Findings:    No new radiographs  Assessment:       Encounter Diagnosis   Name Primary?    Impingement syndrome of left shoulder Yes         Plan:       He is improved but still having difficulty with the extremes of motion.  After a verbal consent and sterile prep I injected  his left shoulder today without complication with a combination of cc of Kenalog several cc of lidocaine.  Continue with the generic shoulder rehab exercises NSAIDs as needed he may advance activities as tolerated follow up can be as needed.        Past Medical History:   Diagnosis Date    Hypertension      Past Surgical History:   Procedure Laterality Date    COLON SURGERY  06/15/2016    colo-vesicle fistula    COLONOSCOPY N/A 2016    Procedure: COLONOSCOPY;  Surgeon: Dmitri Thao MD;  Location: [facility];  Service: Endoscopy;  Laterality: N/A;         Current Outpatient Medications:     lisinopril (PRINIVIL,ZESTRIL) 5 MG tablet, Take 5 mg by mouth once daily., Disp: , Rfl:     meloxicam (MOBIC) 15 MG tablet, Take 1 tablet (15 mg total) by mouth once daily., Disp: 30 tablet, Rfl: 1    multivitamin (THERAGRAN) tablet, Take 1 tablet by mouth once daily. (Patient not taking: Reported on 2024), Disp: , Rfl:     Review of patient's allergies indicates:  No Known Allergies    Family History   Problem Relation Age of Onset    Arthritis Mother     Diabetes Mother     Hyperlipidemia Mother     No Known Problems Father     No Known Problems Sister     Heart disease Brother     Hyperlipidemia Brother     Hypertension Brother     No Known Problems Daughter     No Known Problems Son      Social History     Occupational History    Not on file   Tobacco Use    Smoking status: Former     Current packs/day: 0.00     Average packs/day: 1.5 packs/day for 25.0 years (37.5 ttl pk-yrs)     Types: Cigarettes     Start date: 1986     Quit date: 2011     Years since quittin.2    Smokeless tobacco: Former     Quit date: 2011   Substance and Sexual Activity    Alcohol use: Yes     Alcohol/week: 3.0 standard drinks of alcohol     Types: 3 Cans of beer per week     Comment: 3 cans a day    Drug use: No    Sexual activity: Never

## 2024-02-23 NOTE — TELEPHONE ENCOUNTER
Spoke with Dr. Nguyen regarding short term disability paperwork - Dr. Nguyen did not take pt out of work and stated he can return to work with no restrictions.     Pt notified via i-nexus.     Documents placed at  for pt to .

## 2024-02-28 ENCOUNTER — TELEPHONE (OUTPATIENT)
Dept: ORTHOPEDICS | Facility: CLINIC | Age: 59
End: 2024-02-28
Payer: COMMERCIAL

## 2024-02-28 NOTE — TELEPHONE ENCOUNTER
----- Message from Perlita Mendoza MA sent at 2/28/2024  2:07 PM CST -----  Contact: pt  Pt calling for release of work appt sooner than scheduled   Call back

## 2024-03-01 ENCOUNTER — OFFICE VISIT (OUTPATIENT)
Dept: ORTHOPEDICS | Facility: CLINIC | Age: 59
End: 2024-03-01
Payer: COMMERCIAL

## 2024-03-01 VITALS — BODY MASS INDEX: 28.65 KG/M2 | HEIGHT: 65 IN | HEART RATE: 89 BPM | OXYGEN SATURATION: 98 % | WEIGHT: 171.94 LBS

## 2024-03-01 DIAGNOSIS — M75.42 IMPINGEMENT SYNDROME OF LEFT SHOULDER: Primary | ICD-10-CM

## 2024-03-01 PROCEDURE — 99213 OFFICE O/P EST LOW 20 MIN: CPT | Mod: S$GLB,,, | Performed by: ORTHOPAEDIC SURGERY

## 2024-03-01 PROCEDURE — 99999 PR PBB SHADOW E&M-EST. PATIENT-LVL III: CPT | Mod: PBBFAC,,, | Performed by: ORTHOPAEDIC SURGERY

## 2024-03-01 PROCEDURE — 1159F MED LIST DOCD IN RCRD: CPT | Mod: CPTII,S$GLB,, | Performed by: ORTHOPAEDIC SURGERY

## 2024-03-01 PROCEDURE — 1160F RVW MEDS BY RX/DR IN RCRD: CPT | Mod: CPTII,S$GLB,, | Performed by: ORTHOPAEDIC SURGERY

## 2024-03-01 PROCEDURE — 3008F BODY MASS INDEX DOCD: CPT | Mod: CPTII,S$GLB,, | Performed by: ORTHOPAEDIC SURGERY

## 2024-03-01 NOTE — PROGRESS NOTES
Subjective:      Patient ID: Jarvis Ward is a 59 y.o. male.    Chief Complaint: No chief complaint on file.    HPI  Patient follow up on his left shoulder.  Apparently needs a return to work note before he can doing restricted activities he feels much improved.  ROS      Objective:    Ortho Exam     Constitutional:   Patient is alert  and oriented in no acute distress  HEENT:  normocephalic atraumatic; PERRL EOMI  Neck:  Supple without adenopathy  Cardiovascular:  Normal rate and rhythm  Pulmonary:  Normal respiratory effort normal chest wall expansion  Abdominal:  Nonprotuberant nondistended  Musculoskeletal:  Patient has a steady nonantalgic gait.  Neck is supple with adequate range of motion  No Lhermitte's or Spurling sign.  Adequate range of motion of the left shoulder with discomfort with full forward flexion and internal rotation.  There is a mildly positive impingement sign.  Negative Yergason's and Speed's testing.  No gross weakness however pain with strength testing against resistance there is no swelling mass or atrophy noted.  There is a normal distal neurologic and vascular examination.  Neurological:  No focal defect; cranial nerves 2-12 grossly intact  Psychiatric/behavioral:  Mood and behavior normal    X-Ray Shoulder 2 or More Views Left  Narrative: EXAMINATION:  XR SHOULDER COMPLETE 2 OR MORE VIEWS LEFT    CLINICAL HISTORY:  Pain in left shoulder    TECHNIQUE:  Two or three views of the left shoulder were performed.    COMPARISON:  None    FINDINGS:  There is no fracture or dislocation.  The soft tissues are unremarkable.  Impression: No acute osseous abnormality.    Electronically signed by: Jeffrey Costello MD  Date:    02/06/2024  Time:    08:32       My Radiographs Findings:    No new radiographs  Assessment:       Encounter Diagnosis   Name Primary?    Impingement syndrome of left shoulder Yes         Plan:       Patient can advance activities as tolerated follow up as needed.   Return to work note provided as requested.        Past Medical History:   Diagnosis Date    Hypertension      Past Surgical History:   Procedure Laterality Date    COLON SURGERY  06/15/2016    colo-vesicle fistula    COLONOSCOPY N/A 2016    Procedure: COLONOSCOPY;  Surgeon: Dmitri Thao MD;  Location: [facility];  Service: Endoscopy;  Laterality: N/A;         Current Outpatient Medications:     lisinopril (PRINIVIL,ZESTRIL) 5 MG tablet, Take 5 mg by mouth once daily., Disp: , Rfl:     meloxicam (MOBIC) 15 MG tablet, Take 1 tablet (15 mg total) by mouth once daily. (Patient not taking: Reported on 3/1/2024), Disp: 30 tablet, Rfl: 1    multivitamin (THERAGRAN) tablet, Take 1 tablet by mouth once daily. (Patient not taking: Reported on 3/1/2024), Disp: , Rfl:     Review of patient's allergies indicates:  No Known Allergies    Family History   Problem Relation Age of Onset    Arthritis Mother     Diabetes Mother     Hyperlipidemia Mother     No Known Problems Father     No Known Problems Sister     Heart disease Brother     Hyperlipidemia Brother     Hypertension Brother     No Known Problems Daughter     No Known Problems Son      Social History     Occupational History    Not on file   Tobacco Use    Smoking status: Former     Current packs/day: 0.00     Average packs/day: 1.5 packs/day for 25.0 years (37.5 ttl pk-yrs)     Types: Cigarettes     Start date: 1986     Quit date: 2011     Years since quittin.2    Smokeless tobacco: Former     Quit date: 2011   Substance and Sexual Activity    Alcohol use: Yes     Alcohol/week: 3.0 standard drinks of alcohol     Types: 3 Cans of beer per week     Comment: 3 cans a day    Drug use: No    Sexual activity: Never

## 2024-03-05 ENCOUNTER — TELEPHONE (OUTPATIENT)
Dept: ORTHOPEDICS | Facility: CLINIC | Age: 59
End: 2024-03-05
Payer: COMMERCIAL

## 2024-03-05 NOTE — TELEPHONE ENCOUNTER
Printed and faxed everything requested all documents were filled out signed and faxed. Attempting to contact number provided no answer.

## 2024-03-05 NOTE — TELEPHONE ENCOUNTER
----- Message from Lacho Lieberman MA sent at 3/5/2024  1:19 PM CST -----  Contact: Kishor/Bell  Checking the status of forms she had faxed last month on patients disability.  Call back number is [phone], ext [phone] & fax number is [phone]

## 2024-04-14 ENCOUNTER — OFFICE VISIT (OUTPATIENT)
Dept: URGENT CARE | Facility: CLINIC | Age: 59
End: 2024-04-14
Payer: COMMERCIAL

## 2024-04-14 VITALS
HEIGHT: 66 IN | HEART RATE: 120 BPM | TEMPERATURE: 98 F | SYSTOLIC BLOOD PRESSURE: 172 MMHG | BODY MASS INDEX: 27.64 KG/M2 | OXYGEN SATURATION: 97 % | DIASTOLIC BLOOD PRESSURE: 115 MMHG | RESPIRATION RATE: 16 BRPM | WEIGHT: 172 LBS

## 2024-04-14 DIAGNOSIS — T16.1XXA FOREIGN BODY OF RIGHT EAR, INITIAL ENCOUNTER: ICD-10-CM

## 2024-04-14 DIAGNOSIS — H60.91 OTITIS EXTERNA OF RIGHT EAR, UNSPECIFIED CHRONICITY, UNSPECIFIED TYPE: Primary | ICD-10-CM

## 2024-04-14 PROCEDURE — 99214 OFFICE O/P EST MOD 30 MIN: CPT | Mod: S$GLB,,, | Performed by: NURSE PRACTITIONER

## 2024-04-14 RX ORDER — CIPROFLOXACIN AND DEXAMETHASONE 3; 1 MG/ML; MG/ML
4 SUSPENSION/ DROPS AURICULAR (OTIC) 2 TIMES DAILY
Qty: 7.5 ML | Refills: 0 | Status: SHIPPED | OUTPATIENT
Start: 2024-04-14

## 2024-04-14 RX ORDER — CIPROFLOXACIN 500 MG/1
500 TABLET ORAL EVERY 12 HOURS
Qty: 14 TABLET | Refills: 0 | Status: SHIPPED | OUTPATIENT
Start: 2024-04-14 | End: 2024-04-21

## 2024-04-14 NOTE — PROGRESS NOTES
"Subjective:      Patient ID: Jarvis Ward is a 59 y.o. male.    Vitals:  height is 5' 5.5" (1.664 m) and weight is 78 kg (172 lb). His temperature is 98 °F (36.7 °C). His blood pressure is 172/115 (abnormal) and his pulse is 120 (abnormal). His respiration is 16 and oxygen saturation is 97%.     Chief Complaint: Otalgia    Pt c/o pain to R ear. Treatments tried: none.     Otalgia   There is pain in the right ear. This is a new problem. Episode onset: x2 weeks.       HENT:  Positive for ear pain.       Objective:     Physical Exam   HENT:   Head: Normocephalic.   Ears:   Left Ear: Tympanic membrane and ear canal normal.      Comments: R canal with earbud noted and removed. Diffuse canal edema and erythema underneath. TM intact   Cardiovascular: Normal rate.   Pulmonary/Chest: Effort normal.   Abdominal: Normal appearance.   Neurological: He is alert.   Nursing note and vitals reviewed.      Assessment:     1. Otitis externa of right ear, unspecified chronicity, unspecified type    2. Foreign body of right ear, initial encounter        Plan:       Otitis externa of right ear, unspecified chronicity, unspecified type  -     Ambulatory referral/consult to ENT    Foreign body of right ear, initial encounter    Other orders  -     ciprofloxacin HCl (CIPRO) 500 MG tablet; Take 1 tablet (500 mg total) by mouth every 12 (twelve) hours. for 7 days  Dispense: 14 tablet; Refill: 0  -     ciprofloxacin-dexAMETHasone 0.3-0.1% (CIPRODEX) 0.3-0.1 % DrpS; Place 4 drops into the right ear 2 (two) times daily.  Dispense: 7.5 mL; Refill: 0    Pt presents with otitis for 2 weeks. Noted earbud which was removed. Significant OE noted. Placed on oral and PO cipro with dexamthasone in gtts. ENT referral placed if symptoms do not improve.                 "

## 2025-07-25 ENCOUNTER — HOSPITAL ENCOUNTER (EMERGENCY)
Facility: HOSPITAL | Age: 60
Discharge: LEFT BEFORE DISCHARGE | End: 2025-07-25
Attending: EMERGENCY MEDICINE

## 2025-07-25 VITALS
SYSTOLIC BLOOD PRESSURE: 150 MMHG | HEIGHT: 66 IN | DIASTOLIC BLOOD PRESSURE: 83 MMHG | RESPIRATION RATE: 20 BRPM | WEIGHT: 157 LBS | OXYGEN SATURATION: 99 % | HEART RATE: 99 BPM | TEMPERATURE: 97 F | BODY MASS INDEX: 25.23 KG/M2

## 2025-07-25 PROCEDURE — 99900041 HC LEFT WITHOUT BEING SEEN- EMERGENCY
